# Patient Record
Sex: MALE | Race: WHITE | Employment: FULL TIME | URBAN - METROPOLITAN AREA
[De-identification: names, ages, dates, MRNs, and addresses within clinical notes are randomized per-mention and may not be internally consistent; named-entity substitution may affect disease eponyms.]

---

## 2019-11-21 ENCOUNTER — TELEPHONE (OUTPATIENT)
Dept: PALLIATIVE MEDICINE | Facility: CLINIC | Age: 59
End: 2019-11-21

## 2019-11-21 NOTE — TELEPHONE ENCOUNTER
Received 11-    Injection order from [facility] (Camelia Flynn NP) for a C4-5 FATOU for back pain M54.9. Included with order was recent progress notes and imaging report (xray spine cervical, date of exam 11/9/19).    **[facility] EMR and [facility] order have different PHI for patient - please have patient verify the correct info and update [facility] chart.**    Routing to scheduling coordinators.     Please close encounter once it has been scheduled.      Yuliya Miami  Patient Representative  [facility]

## 2019-11-22 ENCOUNTER — TELEPHONE (OUTPATIENT)
Dept: PALLIATIVE MEDICINE | Facility: CLINIC | Age: 59
End: 2019-11-22

## 2019-11-22 NOTE — TELEPHONE ENCOUNTER
[place] to schedule C4-5 FATOU       Donna Blackman    [place] [facility]

## 2019-11-26 NOTE — TELEPHONE ENCOUNTER
Patient left  to schedule his injection.      Please call        Sunitha SALAZAR    [facility]

## 2019-11-26 NOTE — TELEPHONE ENCOUNTER
[place] to schedule C4-5 FATOU       Donna Blackman    [place] [facility]

## 2019-11-26 NOTE — TELEPHONE ENCOUNTER
Pre-screening Questions for Radiology Injections:    Injection to be done at which interventional clinic site? [facility]    Instruct patient to arrive as directed prior to the scheduled appointment time:    [place] minutes before      [place]: 30 minutes before; if IV needed 1 hour before     Procedure ordered by Camelia Flynn    Procedure ordered? ROZ      Transforaminal Cervical FATOU - Dr. Radha Oleary ONLY    What insurance would patient like us to bill for this procedure? HP      Worker's comp or MVA (motor vehicle accident) -Any injection DO NOT SCHEDULE and route to Sunitha Rea.      [employer] insurance - For SI joint injections, DO NOT SCHEDULE and route Sunitha Rea.       [employer] - Any injection besides hip/shoulder/knee joint DO NOT SCHEDULE and route to Sunitha Rea. She will obtain PA and call pt back to schedule procedure or notify pt of denial.       [employer]-Route to [place] for review      **BCBS- ALL need to be routed to [place] for review if a PA is needed**      IF SCHEDULING IN [place] AND NEEDS A PA, IT IS OKAY TO SCHEDULE. [place] HANDLES THEIR OWN PA'S AFTER THE PATIENT IS SCHEDULED. PLEASE SCHEDULE AT LEAST 1 WEEK OUT SO A PA CAN BE OBTAINED.    Any chance of pregnancy? NO   If YES, do NOT schedule and route to RN pool    Is an  needed? No     Patient has a drive home? (mandatory) YES: ok    Is patient taking any blood thinners (plavix, coumadin, jantoven, warfarin, heparin, pradaxa or dabigatran )? No   If hold needed, do NOT schedule, route to RN pool     Is patient taking any aspirin products (includes Excedrin and Fiorinal)? Yes - Pt takes 81mg daily; instructed to hold 6 day(s) prior to procedure.      If more than 325mg/day do NOT schedule; route to RN pool     For CERVICAL procedures, hold all aspirin products for 6 days.     Tell pt that if aspirin product is not held for 6 days, the procedure WILL BE cancelled.      Does the patient have a bleeding or  clotting disorder? No     If YES, okay to schedule AND route to RN nurse pool    For any patients with platelet count <100, must be forwarded to provider    Is patient diabetic?  No  If YES, instruct them to bring their glucometer.    Does patient have an active infection or treated for one within the past week? No     Is patient currently taking any antibiotics?  No     For patients on chronic, preventative, or prophylactic antibiotics, procedures may be scheduled.     For patients on antibiotics for active or recent infection:antibiotic course must have been completed for 4 days    Is patient currently taking any steroid medications? (i.e. Prednisone, Medrol)  No     For patients on steroid medications, course must have been completed for 4 days    Reviewed with patient:  If you are started on any steroids or antibiotics between now and your appointment, you must contact us because the procedure may need to be cancelled.  Yes    Is patient actively being treated for cancer or immunocompromised? No  If YES, do NOT schedule and route to RN pool     Are you able to get on and off an exam table with minimal or no assistance? Yes  If NO, do NOT schedule and route to RN pool    Are you able to roll over and lay on your stomach with minimal or no assistance? Yes  If NO, do NOT schedule and route to RN pool     Any allergies to contrast dye, iodine, shellfish, or numbing and steroid medications? No  If YES, route to RN pool AND add allergy information to appointment notes    Allergies: Patient has no allergy information on record.      Has the patient had a flu shot or any other vaccinations within 7 days before or after the procedure.  No     Does patient have an MRI/CT?  YES: MRI  Check Procedure Scheduling Grid to see if required.      Was the MRI done within the last 3 years?  Yes    If yes, where was the MRI done i.e.[facility], [facility], [facility], [facility] etc? [facility]      If no, do not  schedule and route to RN pool    If MRI was not done at [place] [facility], [facility] or [facility] do NOT schedule and route to RN pool.      If pt has an imaging disc, the injection MAY be scheduled but pt has to bring disc to appt.     If they show up without the disc the injection cannot be done    Reminders (please tell patient if applicable):       Instructed pt to arrive 30 minutes early for IV start if required. (Check Procedure Scheduling Grid)  Not Applicable      If celiac plexus block, informed patient NPO for 6 hours and that it is okay to take medications with sips of water, especially blood pressure medications  Not Applicable         If this is for a cervical procedure, informed patient that aspirin needs to be held for 6 days.   Not Applicable      For all patients not having spinal cord stimulator (SCS) trials or radiofrequency ablations (RFAs), informed patient:    IV sedation is not provided for this procedure.  If you feel that an oral anti-anxiety medication is needed, you can discuss this further with your referring provider or primary care provider.  The Pain Clinic provider will discuss specifics of what the procedure includes at your appointment.  Most procedures last 10-20 minutes.  We use numbing medications to help with any discomfort during the procedure.  Not Applicable      Do not schedule procedures requiring IV placement in the first appointment of the day or first appointment after lunch. Do NOT schedule at 0745, 0815 or 1245.       For patients 85 or older we recommend having an adult stay w/ them for the remainder of the day.       Does the patient have any questions?  NO  Dania Mcmahon  [facility]

## 2019-11-27 NOTE — TELEPHONE ENCOUNTER
Injection's been scheduled. Closing encounter.    Yuliya Skippack  Patient Representative  [facility] [place]

## 2019-11-29 ENCOUNTER — TELEPHONE (OUTPATIENT)
Dept: PALLIATIVE MEDICINE | Facility: CLINIC | Age: 59
End: 2019-11-29

## 2019-11-29 NOTE — TELEPHONE ENCOUNTER
[facility] closed on 11/29 but nursing to call Monday 12/02 to request imaging to be pushed to PACS, provider will need to review prior to injection      Renata TOSCANO, RN Care Coordinator  [facility]  Pain [facility]

## 2019-12-02 NOTE — TELEPHONE ENCOUNTER
Imaging found in PACS.     Renata COOKN, RN Care Coordinator  [facility]  Pain [facility]

## 2019-12-02 NOTE — TELEPHONE ENCOUNTER
Called [facility]. They will push the MRI of the Cervical Spine through PACS for the ROZ scheduled on 12/06/19.      Jessica Bernstein RN  Care Coordinator  [facility]

## 2019-12-05 NOTE — TELEPHONE ENCOUNTER
Discussed with provider, order is for C4-5. After review of imaging, provider would only C7-T1.    Called [facility] to notified and inquire if C4-5 if needed vs ok to to C7-T1. LM for staff to call back.    Renata TOSCANO, RN Care Coordinator  [facility]  Pain Management

## 2019-12-05 NOTE — PROGRESS NOTES
[facility] - Procedure Note    Date of Visit: 12/5/2019    Procedure performed: C7-T1 interlaminar epidural steroid injection with fluoroscopic guidance  Diagnosis: Cervical spondylosis; Cervical radiculitis/radiculopathy  : Bharati Baer MD  Anesthesia: None    Indications: Walker France is a 59 year old male who is seen at the request of  *** for cervical epidural steroid injection. The patient describes ***. The patient has been exhibiting symptoms consistent with cervical intraspinal inflammation and radiculopathy. Symptoms have been persistent, disabling, and intermittently severe. The patient reports minimal improvement with conservative treatment, including ***.    Cervical MRI was done on *** which showed   ***    Allergies:    Allergies not on file     Vitals:  There were no vitals taken for this visit.    Review of Systems: The patient denies recent fever, chills, illness, use of antibiotics or anticoagulants. All other 10-point review of systems negative.     Procedure: The procedure and risks were explained, and informed written consent was obtained from the patient. Risks include but are not limited to: infection, bleeding, increased pain, and damage to soft tissue, nerve, muscle, and vasculature structures. After getting informed consent, patient was brought into the procedure suite and was placed in a prone position on the procedure table. A Pause for the Cause was performed. Patient was prepped and draped in sterile fashion.     The C7-T1 interspace was identified with use of fluoroscopy in AP view. A 25-gauge, 1.5 inch needle was used to anesthetize the skin and subcutaneous tissue entry site with a total of 2 ml of 1% lidocaine. Under fluoroscopic visualization, a 22-gauge, 3.5 inch Tuohy epidural needle was slowly advanced towards the epidural space a few millimeters {[id number]} of midline. The latter part of the needle advancement was guided with fluoroscopy in  the lateral view. The epidural space was identified using loss of resistance technique. After negative aspiration for heme and cerebrospinal fluid, a total of 1 mL of non-ionic contrast was injected to confirm needle placement. 9 mL of contrast was wasted. Epidurogram confirmed spread within the posterior epidural space. A solution containing 1ml of 10mg/ml of Dexamethasone and 2 ml of preservative free saline was injected. The needle was removed.  Images were saved to PACS.    The patient tolerated the procedure well, and there was no evidence of procedural complications. No new sensory or motor deficits were noted following the procedure. The patient was stable and able to ambulate on discharge home. Post-procedure instructions were provided.     Pre-procedure pain score: ***/10 in the neck, ***/10 in the arm  Post-procedure pain score: ***/10 in the neck, ***/10 in the arm    Assessment/Plan: Walker France is a 59 year old male s/p cervical interlaminar epidural steroid injection today for cervical spondylosis and radiculitis/radiculopathy.     1. Following today's procedure, the patient was advised to contact the [facility] for any of the following:   Fever, chills, or night sweats   New onset of pain, numbness, or weakness   Any questions/concerns regarding the procedure  If unable to contact the Pain Center, the patient was instructed to go to a local Emergency Room for any complications.   2. The patient will receive a follow-up call in 1 week.   3. Follow-up with the referring provider in 2 weeks for post-procedure evaluation.    Bharati Baer MD  [facility] [place]

## 2019-12-06 ENCOUNTER — RADIOLOGY INJECTION OFFICE VISIT (OUTPATIENT)
Dept: PALLIATIVE MEDICINE | Facility: CLINIC | Age: 59
End: 2019-12-06
Payer: COMMERCIAL

## 2019-12-06 VITALS — DIASTOLIC BLOOD PRESSURE: 87 MMHG | HEART RATE: 74 BPM | SYSTOLIC BLOOD PRESSURE: 111 MMHG | OXYGEN SATURATION: 96 %

## 2019-12-06 NOTE — TELEPHONE ENCOUNTER
Jessica from [facility] Camelia Flynn's office returned my call. Informed her we had to send Walker France home due to him finishing a steroid yesterday.  They need to be off of the steroid 4 days prior to having the injection. Jessica stated they did not put him on the steroids. I am not sure who prescribed the steroid for him but he did take it.   Solo is also on Brillinta a blood thinner and did not hold it.  I am not sure how this was missed but never the less he took it. We did apologize to Walker that we were unable to do his injection.  Informed Jessica we can certainly reschedule Walker.  We would have to do a Anticoagulation hold and Walker would need to continue to be off of a steroid. We are able to to the C7-T1 Interlaminar injection, but if they want the C4-C5 Interlaminar we would not be able to perform that procedure.  Jessica states she will discuss this with Camelia Flynn NP and let us know if they want us to get him back on our schedule or if they will have him go elsewhere.     Dr Baer notified of this phone call verbally.     Jessica Bernstein RN  Care Coordinator  [facility]

## 2019-12-06 NOTE — NURSING NOTE
Pre-procedure Intake    Have you been fasting? NA    If yes, for how long?     Are you taking a prescribed blood thinner such as coumadin, Plavix, Xarelto?    Yes -   Brilinta 90mg    If yes, when did you take your last dose? 12/06/19     Do you take aspirin?  Yes -   ASA    If cervical procedure, have you held aspirin for 6 days?   Yes    Do you have any allergies to contrast dye, iodine, steroid and/or numbing medications?  NO    Are you currently taking antibiotics or have an active infection?  NO    Have you had a fever/elevated temperature within the past week? NO    Are you currently taking oral steroids? NO    Do you have a ? Yes       Are you pregnant or breastfeeding?  Not Applicable    Are the vital signs normal?  Yes

## 2019-12-09 NOTE — TELEPHONE ENCOUNTER
Patient called wondering when he can schedule his injection. He stated he finished his steroid on December 4th was the last day. He also stated when we went through the prescreening questions he wasn't on the blood thinner at that time. Informed patient not to hold anything until nursing informs him to      Donna Blackman    [facility] [facility]

## 2019-12-09 NOTE — TELEPHONE ENCOUNTER
Pt stated he is waiting on c/b to reschedule his ROZ. Pt is taking Brilinta 90mg and per pt his Dr does not want him to go off of it. Please call to advise.      Ivan CONTEH    [place] [facility] [place]

## 2019-12-09 NOTE — TELEPHONE ENCOUNTER
See other telephone encounter .    Jessica Bernstein RN  Care Coordinator  [facility]

## 2019-12-09 NOTE — TELEPHONE ENCOUNTER
Called Jessica.  Ellie asking her to call us back.  Pt is calling us to set up his ROZ injection. We did not hear back from them if they still wanted him to come here for the injection. We can do the C7-T1 but can't do the C4-C5 interlaminar.    Now pt is calling because primary care provider does not want him to stop the Brillinta.   We need the anticoagulation hold 5 days prior to the cervical injection.  We would not be able to do the injection with Walker being on a blood thinner.    Called Walker.  Informed him to contact Jessica with Camelia Flynn's office.Since his primary care provider does not want him to go off of the Brillinta we would not be able to do the injection here. He would need to see where she wants him to go now.  If the PCP is willing to do the hold, we can do the injection here if Camelia Flynn is okay with it. Pt is on the Brillinta  Because he had a  heart attack 08-16-19.  He had a stent placed. He was told he would be on the Brillinta 6-12 months.  Will wait for a call back from either Walker or Jessica.    Jessica Bernstein RN  Care Coordinator  [facility]

## 2019-12-17 NOTE — TELEPHONE ENCOUNTER
No return call from [facility]. Will close encounter.    Jessica Bernstein RN  Care Coordinator  [facility]